# Patient Record
Sex: MALE | Race: WHITE | NOT HISPANIC OR LATINO | Employment: UNEMPLOYED | URBAN - METROPOLITAN AREA
[De-identification: names, ages, dates, MRNs, and addresses within clinical notes are randomized per-mention and may not be internally consistent; named-entity substitution may affect disease eponyms.]

---

## 2017-07-02 ENCOUNTER — HOSPITAL ENCOUNTER (INPATIENT)
Facility: HOSPITAL | Age: 27
LOS: 4 days | Discharge: PSYCHIATRIC OR SUBSTANCE USE FACILITY | DRG: 917 | End: 2017-07-06
Attending: EMERGENCY MEDICINE | Admitting: HOSPITALIST
Payer: COMMERCIAL

## 2017-07-02 DIAGNOSIS — T43.622A: Primary | ICD-10-CM

## 2017-07-02 DIAGNOSIS — T50.901A DRUG OVERDOSE: ICD-10-CM

## 2017-07-02 DIAGNOSIS — F33.2 SEVERE EPISODE OF RECURRENT MAJOR DEPRESSIVE DISORDER, WITHOUT PSYCHOTIC FEATURES: ICD-10-CM

## 2017-07-02 DIAGNOSIS — T43.624A: ICD-10-CM

## 2017-07-02 PROBLEM — T43.621A AMPHETAMINE OVERDOSE: Status: ACTIVE | Noted: 2017-07-02

## 2017-07-02 LAB
ALBUMIN SERPL BCP-MCNC: 4.7 G/DL
ALP SERPL-CCNC: 86 U/L
ALT SERPL W/O P-5'-P-CCNC: 14 U/L
AMPHET+METHAMPHET UR QL: NORMAL
ANION GAP SERPL CALC-SCNC: 17 MMOL/L
APAP SERPL-MCNC: <3 UG/ML
AST SERPL-CCNC: 16 U/L
BARBITURATES UR QL SCN>200 NG/ML: NEGATIVE
BASOPHILS # BLD AUTO: 0.1 K/UL
BASOPHILS NFR BLD: 0.5 %
BENZODIAZ UR QL SCN>200 NG/ML: NORMAL
BILIRUB SERPL-MCNC: 0.5 MG/DL
BILIRUB UR QL STRIP: NEGATIVE
BNP SERPL-MCNC: <10 PG/ML
BUN SERPL-MCNC: 14 MG/DL
BZE UR QL SCN: NEGATIVE
CALCIUM SERPL-MCNC: 10.1 MG/DL
CANNABINOIDS UR QL SCN: NEGATIVE
CHLORIDE SERPL-SCNC: 105 MMOL/L
CK SERPL-CCNC: 145 U/L
CLARITY UR: CLEAR
CO2 SERPL-SCNC: 18 MMOL/L
COLOR UR: YELLOW
CREAT SERPL-MCNC: 1 MG/DL
CREAT UR-MCNC: 328.4 MG/DL
DIFFERENTIAL METHOD: ABNORMAL
EOSINOPHIL # BLD AUTO: 0 K/UL
EOSINOPHIL NFR BLD: 0 %
ERYTHROCYTE [DISTWIDTH] IN BLOOD BY AUTOMATED COUNT: 12.7 %
EST. GFR  (AFRICAN AMERICAN): >60 ML/MIN/1.73 M^2
EST. GFR  (NON AFRICAN AMERICAN): >60 ML/MIN/1.73 M^2
ETHANOL SERPL-MCNC: <10 MG/DL
GLUCOSE SERPL-MCNC: 112 MG/DL
GLUCOSE UR QL STRIP: NEGATIVE
HCT VFR BLD AUTO: 41.9 %
HGB BLD-MCNC: 14.3 G/DL
HGB UR QL STRIP: NEGATIVE
KETONES UR QL STRIP: NEGATIVE
LACTATE SERPL-SCNC: 0.7 MMOL/L
LACTATE SERPL-SCNC: 0.8 MMOL/L
LACTATE SERPL-SCNC: 4.1 MMOL/L
LEUKOCYTE ESTERASE UR QL STRIP: NEGATIVE
LYMPHOCYTES # BLD AUTO: 2.4 K/UL
LYMPHOCYTES NFR BLD: 19.7 %
MCH RBC QN AUTO: 30.8 PG
MCHC RBC AUTO-ENTMCNC: 34 %
MCV RBC AUTO: 91 FL
METHADONE UR QL SCN>300 NG/ML: NEGATIVE
MONOCYTES # BLD AUTO: 1.2 K/UL
MONOCYTES NFR BLD: 10 %
NEUTROPHILS # BLD AUTO: 8.4 K/UL
NEUTROPHILS NFR BLD: 69.8 %
NITRITE UR QL STRIP: NEGATIVE
OPIATES UR QL SCN: NEGATIVE
PCP UR QL SCN>25 NG/ML: NEGATIVE
PH UR STRIP: 6 [PH] (ref 5–8)
PLATELET # BLD AUTO: 313 K/UL
PMV BLD AUTO: 7.7 FL
POTASSIUM SERPL-SCNC: 3.5 MMOL/L
PROT SERPL-MCNC: 8 G/DL
PROT UR QL STRIP: NEGATIVE
RBC # BLD AUTO: 4.63 M/UL
SALICYLATES SERPL-MCNC: <5 MG/DL
SODIUM SERPL-SCNC: 140 MMOL/L
SP GR UR STRIP: 1.02 (ref 1–1.03)
TOXICOLOGY INFORMATION: NORMAL
TROPONIN I SERPL DL<=0.01 NG/ML-MCNC: <0.006 NG/ML
TSH SERPL DL<=0.005 MIU/L-ACNC: 1.4 UIU/ML
URN SPEC COLLECT METH UR: NORMAL
UROBILINOGEN UR STRIP-ACNC: NEGATIVE EU/DL
WBC # BLD AUTO: 12 K/UL

## 2017-07-02 PROCEDURE — 20000000 HC ICU ROOM

## 2017-07-02 PROCEDURE — 96375 TX/PRO/DX INJ NEW DRUG ADDON: CPT

## 2017-07-02 PROCEDURE — 93005 ELECTROCARDIOGRAM TRACING: CPT

## 2017-07-02 PROCEDURE — 25000003 PHARM REV CODE 250: Performed by: NURSE PRACTITIONER

## 2017-07-02 PROCEDURE — 84484 ASSAY OF TROPONIN QUANT: CPT

## 2017-07-02 PROCEDURE — 96360 HYDRATION IV INFUSION INIT: CPT | Mod: XS

## 2017-07-02 PROCEDURE — 80320 DRUG SCREEN QUANTALCOHOLS: CPT

## 2017-07-02 PROCEDURE — 96366 THER/PROPH/DIAG IV INF ADDON: CPT

## 2017-07-02 PROCEDURE — 83605 ASSAY OF LACTIC ACID: CPT

## 2017-07-02 PROCEDURE — 80329 ANALGESICS NON-OPIOID 1 OR 2: CPT

## 2017-07-02 PROCEDURE — 63600175 PHARM REV CODE 636 W HCPCS: Performed by: NURSE PRACTITIONER

## 2017-07-02 PROCEDURE — 81003 URINALYSIS AUTO W/O SCOPE: CPT

## 2017-07-02 PROCEDURE — 83605 ASSAY OF LACTIC ACID: CPT | Mod: 91

## 2017-07-02 PROCEDURE — 82550 ASSAY OF CK (CPK): CPT

## 2017-07-02 PROCEDURE — 80307 DRUG TEST PRSMV CHEM ANLYZR: CPT

## 2017-07-02 PROCEDURE — 85025 COMPLETE CBC W/AUTO DIFF WBC: CPT

## 2017-07-02 PROCEDURE — 80053 COMPREHEN METABOLIC PANEL: CPT

## 2017-07-02 PROCEDURE — 84443 ASSAY THYROID STIM HORMONE: CPT

## 2017-07-02 PROCEDURE — 25000003 PHARM REV CODE 250: Performed by: HOSPITALIST

## 2017-07-02 PROCEDURE — 96372 THER/PROPH/DIAG INJ SC/IM: CPT

## 2017-07-02 PROCEDURE — 83880 ASSAY OF NATRIURETIC PEPTIDE: CPT

## 2017-07-02 PROCEDURE — 96376 TX/PRO/DX INJ SAME DRUG ADON: CPT

## 2017-07-02 PROCEDURE — 63600175 PHARM REV CODE 636 W HCPCS

## 2017-07-02 PROCEDURE — 96365 THER/PROPH/DIAG IV INF INIT: CPT

## 2017-07-02 PROCEDURE — 99285 EMERGENCY DEPT VISIT HI MDM: CPT | Mod: 25

## 2017-07-02 PROCEDURE — 36415 COLL VENOUS BLD VENIPUNCTURE: CPT

## 2017-07-02 RX ORDER — SODIUM,POTASSIUM PHOSPHATES 280-250MG
2 POWDER IN PACKET (EA) ORAL
Status: DISCONTINUED | OUTPATIENT
Start: 2017-07-02 | End: 2017-07-03

## 2017-07-02 RX ORDER — DEXMEDETOMIDINE HYDROCHLORIDE 4 UG/ML
0.2 INJECTION, SOLUTION INTRAVENOUS CONTINUOUS
Status: DISCONTINUED | OUTPATIENT
Start: 2017-07-02 | End: 2017-07-04

## 2017-07-02 RX ORDER — ZIPRASIDONE MESYLATE 20 MG/ML
10 INJECTION, POWDER, LYOPHILIZED, FOR SOLUTION INTRAMUSCULAR EVERY 6 HOURS PRN
Status: DISCONTINUED | OUTPATIENT
Start: 2017-07-02 | End: 2017-07-06 | Stop reason: HOSPADM

## 2017-07-02 RX ORDER — ENOXAPARIN SODIUM 100 MG/ML
40 INJECTION SUBCUTANEOUS EVERY 24 HOURS
Status: DISCONTINUED | OUTPATIENT
Start: 2017-07-03 | End: 2017-07-06 | Stop reason: HOSPADM

## 2017-07-02 RX ORDER — ZIPRASIDONE MESYLATE 20 MG/ML
10 INJECTION, POWDER, LYOPHILIZED, FOR SOLUTION INTRAMUSCULAR EVERY 6 HOURS PRN
Status: DISCONTINUED | OUTPATIENT
Start: 2017-07-02 | End: 2017-07-02

## 2017-07-02 RX ORDER — POTASSIUM CHLORIDE 20 MEQ/15ML
60 SOLUTION ORAL
Status: DISCONTINUED | OUTPATIENT
Start: 2017-07-02 | End: 2017-07-03

## 2017-07-02 RX ORDER — SODIUM CHLORIDE 9 MG/ML
INJECTION, SOLUTION INTRAVENOUS CONTINUOUS
Status: DISCONTINUED | OUTPATIENT
Start: 2017-07-02 | End: 2017-07-03

## 2017-07-02 RX ORDER — ONDANSETRON 2 MG/ML
4 INJECTION INTRAMUSCULAR; INTRAVENOUS
Status: DISCONTINUED | OUTPATIENT
Start: 2017-07-02 | End: 2017-07-02

## 2017-07-02 RX ORDER — ONDANSETRON 2 MG/ML
4 INJECTION INTRAMUSCULAR; INTRAVENOUS EVERY 8 HOURS PRN
Status: DISCONTINUED | OUTPATIENT
Start: 2017-07-02 | End: 2017-07-06 | Stop reason: HOSPADM

## 2017-07-02 RX ORDER — POTASSIUM CHLORIDE 20 MEQ/15ML
40 SOLUTION ORAL
Status: DISCONTINUED | OUTPATIENT
Start: 2017-07-02 | End: 2017-07-03

## 2017-07-02 RX ORDER — ONDANSETRON 2 MG/ML
4 INJECTION INTRAMUSCULAR; INTRAVENOUS
Status: COMPLETED | OUTPATIENT
Start: 2017-07-02 | End: 2017-07-02

## 2017-07-02 RX ORDER — LANOLIN ALCOHOL/MO/W.PET/CERES
800 CREAM (GRAM) TOPICAL
Status: DISCONTINUED | OUTPATIENT
Start: 2017-07-02 | End: 2017-07-03

## 2017-07-02 RX ORDER — HALOPERIDOL 5 MG/ML
5 INJECTION INTRAMUSCULAR EVERY 6 HOURS PRN
Status: DISCONTINUED | OUTPATIENT
Start: 2017-07-02 | End: 2017-07-02

## 2017-07-02 RX ORDER — HALOPERIDOL 5 MG/ML
INJECTION INTRAMUSCULAR
Status: COMPLETED
Start: 2017-07-02 | End: 2017-07-02

## 2017-07-02 RX ORDER — LORAZEPAM 2 MG/ML
4 INJECTION INTRAMUSCULAR
Status: COMPLETED | OUTPATIENT
Start: 2017-07-02 | End: 2017-07-02

## 2017-07-02 RX ORDER — GLUCAGON 1 MG
1 KIT INJECTION
Status: DISCONTINUED | OUTPATIENT
Start: 2017-07-02 | End: 2017-07-03

## 2017-07-02 RX ORDER — PANTOPRAZOLE SODIUM 40 MG/1
40 TABLET, DELAYED RELEASE ORAL DAILY
Status: DISCONTINUED | OUTPATIENT
Start: 2017-07-03 | End: 2017-07-06 | Stop reason: HOSPADM

## 2017-07-02 RX ORDER — IBUPROFEN 200 MG
24 TABLET ORAL
Status: DISCONTINUED | OUTPATIENT
Start: 2017-07-02 | End: 2017-07-03

## 2017-07-02 RX ORDER — HALOPERIDOL 5 MG/ML
5 INJECTION INTRAMUSCULAR EVERY 6 HOURS PRN
Status: DISCONTINUED | OUTPATIENT
Start: 2017-07-02 | End: 2017-07-06 | Stop reason: HOSPADM

## 2017-07-02 RX ORDER — IBUPROFEN 200 MG
16 TABLET ORAL
Status: DISCONTINUED | OUTPATIENT
Start: 2017-07-02 | End: 2017-07-03

## 2017-07-02 RX ORDER — AMOXICILLIN 250 MG
1 CAPSULE ORAL 2 TIMES DAILY
Status: DISCONTINUED | OUTPATIENT
Start: 2017-07-02 | End: 2017-07-06 | Stop reason: HOSPADM

## 2017-07-02 RX ADMIN — LORAZEPAM 4 MG: 2 INJECTION INTRAMUSCULAR; INTRAVENOUS at 12:07

## 2017-07-02 RX ADMIN — DEXMEDETOMIDINE HYDROCHLORIDE 0.2 MCG/KG/HR: 4 INJECTION, SOLUTION INTRAVENOUS at 02:07

## 2017-07-02 RX ADMIN — HALOPERIDOL LACTATE 5 MG: 5 INJECTION, SOLUTION INTRAMUSCULAR at 02:07

## 2017-07-02 RX ADMIN — SODIUM CHLORIDE: 0.9 INJECTION, SOLUTION INTRAVENOUS at 05:07

## 2017-07-02 RX ADMIN — SODIUM CHLORIDE 1000 ML: 0.9 INJECTION, SOLUTION INTRAVENOUS at 12:07

## 2017-07-02 RX ADMIN — ONDANSETRON 4 MG: 2 INJECTION INTRAMUSCULAR; INTRAVENOUS at 12:07

## 2017-07-02 RX ADMIN — LORAZEPAM 4 MG: 2 INJECTION INTRAMUSCULAR; INTRAVENOUS at 11:07

## 2017-07-02 RX ADMIN — DEXMEDETOMIDINE HYDROCHLORIDE 0.7 MCG/KG/HR: 4 INJECTION, SOLUTION INTRAVENOUS at 01:07

## 2017-07-02 RX ADMIN — POISON ADSORBENT 25 G: 50 SUSPENSION ORAL at 11:07

## 2017-07-02 RX ADMIN — HALOPERIDOL 5 MG: 5 INJECTION INTRAMUSCULAR at 02:07

## 2017-07-02 RX ADMIN — POLYETHYLENE GLYCOL-3350 AND ELECTROLYTES WITH FLAVOR PACK 4000 ML: 240; 5.84; 2.98; 6.72; 22.72 POWDER, FOR SOLUTION ORAL at 12:07

## 2017-07-02 RX ADMIN — DEXMEDETOMIDINE HYDROCHLORIDE 0.5 MCG/KG/HR: 4 INJECTION, SOLUTION INTRAVENOUS at 04:07

## 2017-07-02 RX ADMIN — SODIUM CHLORIDE 1000 ML: 0.9 INJECTION, SOLUTION INTRAVENOUS at 11:07

## 2017-07-02 NOTE — ED NOTES
Poison Control called. Recommendation is for benzodiazepine titration to keep agitation and heart rate down.

## 2017-07-02 NOTE — HPI
"The patient is a 26 y.o. male who is presenting per EMS with the acute onset of an amphetamine OD. Per EMS, the pt took x 2 grams of ICE (cystal methamphetamine) approximately 1 hour before arrival to the ED. The pt states that he took "way more than his normal .5 grams" in an attempt to commit suicide but now adamantly denies any desire to die. The pt additionally reports that he has been "out of his antidepressants" and cannot afford the rx. Per EMS the pt is currently living with his father and two children in a hotel.     In ED, patient is extremely agitated, psychotic, and unable to clearly verbalize or comprehend commands. He was heavily sedated, and given activated charcoal to avoid harming himself and was admitted to hospital medicine for management of his overdose. I am unable to do a medical history/ Soc Hx and Fam Hx d/t patient confusion and agitation.  "

## 2017-07-02 NOTE — ASSESSMENT & PLAN NOTE
Severe, intentional per original note. Likely toxidrome will be resolved in 12-24 hours. Continue aggressive management of agitation with precedex gtt, IM haldol and Geodon, as well as monitoring of mental status and Vital signs. Patient failed benzo management. Patient currently PEC'd. Psych consulted.

## 2017-07-02 NOTE — SUBJECTIVE & OBJECTIVE
Past Medical History:   Diagnosis Date    Substance abuse withdrawal with complication        History reviewed. No pertinent surgical history.    Review of patient's allergies indicates:  No Known Allergies    No current facility-administered medications on file prior to encounter.      No current outpatient prescriptions on file prior to encounter.     Family History     Problem Relation (Age of Onset)    Drug abuse Father        Social History Main Topics    Smoking status: Current Every Day Smoker     Packs/day: 0.50     Types: Cigarettes    Smokeless tobacco: Not on file    Alcohol use Yes    Drug use:      Types: Methamphetamines    Sexual activity: Not on file     Review of Systems   Unable to perform ROS: Psychiatric disorder     Objective:     Vital Signs (Most Recent):  Temp: 97.2 °F (36.2 °C) (07/02/17 1119)  Pulse: 109 (07/02/17 1500)  Resp: (!) 28 (07/02/17 1119)  BP: 132/63 (07/02/17 1500)  SpO2: 100 % (07/02/17 1451) Vital Signs (24h Range):  Temp:  [97.2 °F (36.2 °C)] 97.2 °F (36.2 °C)  Pulse:  [109-151] 109  Resp:  [28] 28  SpO2:  [86 %-100 %] 100 %  BP: (114-168)/(61-79) 132/63     Weight: 83.9 kg (185 lb)  Body mass index is 26.54 kg/m².    Physical Exam   Constitutional: No distress.   Thin, emaciated   HENT:   Head: Normocephalic.   Mouth/Throat: Oropharynx is clear and moist.   Eyes: EOM are normal. Pupils are equal, round, and reactive to light. No scleral icterus.   Cardiovascular: Normal heart sounds and intact distal pulses.  Exam reveals no friction rub.    No murmur heard.  extremely tachycardic   Pulmonary/Chest: Effort normal and breath sounds normal. No respiratory distress. He has no wheezes.   Abdominal: Soft. Bowel sounds are normal. He exhibits no distension. There is no tenderness.   Musculoskeletal: Normal range of motion. He exhibits no edema.   Neurological: He is alert. He has normal reflexes.   Patient acutely confused, akasthetic   Skin: No rash noted. He is not  diaphoretic. No erythema.   Multiple skin lesions   Psychiatric:   Severe psychosis, akasthesia, confusion.   Nursing note and vitals reviewed.       Significant Labs: All pertinent labs within the past 24 hours have been reviewed.    Significant Imaging: I have reviewed all pertinent imaging results/findings within the past 24 hours.

## 2017-07-02 NOTE — ED PROVIDER NOTES
"Encounter Date: 7/2/2017    SCRIBE #1 NOTE: I, Berna Westfall , am scribing for, and in the presence of, Dr. Pereyra .       History     Chief Complaint   Patient presents with    Drug Overdose     " ate 2 grams of ICE "     07/02/2017  11:26 AM     Chief Complaint: Drug OD      The patient is a 26 y.o. male who is presenting per EMS with the acute onset of an amphetamine OD. Per EMS, the pt took x 2 grams of ICE (cystal methamphetamine) approximately 1 hour before arrival to the ED. Per EMS, the pt stated that he took more than his normal 0.5 grams in an attempt to commit suicide but now denies any desire to die. The pt additionally reports that he has been out of his antidepressants and cannot afford the rx. Per EMS the pt is currently living with his father and two children in a hotel.           The history is provided by the patient and the EMS personnel.     Review of patient's allergies indicates:  Allergies not on file  Past Medical History:   Diagnosis Date    Substance abuse withdrawal with complication      History reviewed. No pertinent surgical history.  Family History   Problem Relation Age of Onset    Drug abuse Father      Social History   Substance Use Topics    Smoking status: Current Every Day Smoker     Packs/day: 0.50     Types: Cigarettes    Smokeless tobacco: Not on file    Alcohol use Yes     Review of Systems   Unable to perform ROS: Mental status change (drug OD)       Physical Exam     Initial Vitals [07/02/17 1119]   BP Pulse Resp Temp SpO2   114/65 -- -- -- --      MAP       81.33         Physical Exam    Nursing note and vitals reviewed.  Constitutional: He appears toxic. He appears distressed.   Disheveled    HENT:   Head: Normocephalic and atraumatic.   Eyes: EOM are normal.   Pupils are dilated bilaterally    Neck: Normal range of motion. Neck supple.   Cardiovascular: Regular rhythm, normal heart sounds and intact distal pulses. Exam reveals no gallop and no friction rub.    No " murmur heard.  Tachycardic    Pulmonary/Chest: Breath sounds normal. He has no wheezes. He has no rhonchi. He has no rales.   Abdominal: Soft. He exhibits no distension. There is no tenderness.   Musculoskeletal: Normal range of motion.   Neurological: He is alert.   Hypertonia noted to RUE.    Skin: Skin is warm and dry. Capillary refill takes less than 2 seconds.   Psychiatric: His mood appears anxious. His speech is rapid and/or pressured. He is agitated, hyperactive and actively hallucinating. Thought content is delusional. Cognition and memory are impaired. He expresses inappropriate judgment. He does not express impulsivity. He exhibits a depressed mood. He expresses suicidal ideation. He expresses suicidal plans. He is noncommunicative.         ED Course   Procedures  Labs Reviewed   CBC W/ AUTO DIFFERENTIAL - Abnormal; Notable for the following:        Result Value    MPV 7.7 (*)     Gran # 8.4 (*)     Mono # 1.2 (*)     All other components within normal limits   COMPREHENSIVE METABOLIC PANEL - Abnormal; Notable for the following:     CO2 18 (*)     Glucose 112 (*)     Anion Gap 17 (*)     All other components within normal limits   ACETAMINOPHEN LEVEL - Abnormal; Notable for the following:     Acetaminophen (Tylenol), Serum <3.0 (*)     All other components within normal limits   SALICYLATE LEVEL - Abnormal; Notable for the following:     Salicylate Lvl <5.0 (*)     All other components within normal limits   LACTIC ACID, PLASMA - Abnormal; Notable for the following:     Lactate (Lactic Acid) 4.1 (*)     All other components within normal limits    Narrative:        critical LA result(s) called and verbal readback obtained from   LUAN Graham @8948, 07/02/2017 12:51   TSH   DRUG SCREEN PANEL, URINE EMERGENCY   ALCOHOL,MEDICAL (ETHANOL)   TROPONIN I   B-TYPE NATRIURETIC PEPTIDE   URINALYSIS   CK   LACTIC ACID, PLASMA                   APC / Resident Notes:   Elbert Multani is a 26 year old male presenting to the  ED after suicide attempt by ingestion of crystal meth. The patient was tachycardic and hypertensive initially. Ingestion occurred approximately one hour PTA so patient was given activated charcoal and go-lytely. He was also aggressively treated with IV ativan but eventually required a precedex drip for symptom control. He was admitted to ICU to Dr. Griffiths for continued management. Patient was placed under a PEC due to suicidal ideation with attempt.        Scribe Attestation:   Scribe #1: I performed the above scribed service and the documentation accurately describes the services I performed. I attest to the accuracy of the note.    Attending Attestation:           Physician Attestation for Scribe:  Physician Attestation Statement for Scribe #1: I, Dr. Pereyra , reviewed documentation, as scribed by Berna Westfall in my presence, and it is both accurate and complete.                 ED Course     Clinical Impression:   The primary encounter diagnosis was Amphetamine overdose, intentional self-harm, initial encounter. A diagnosis of Drug overdose was also pertinent to this visit.    Disposition:   Disposition: Admitted  Condition: Serious                        April Zaragoza NP  07/02/17 4063

## 2017-07-02 NOTE — H&P
"[facility] Hospital Medicine  History & Physical    Patient Name: Elbert Multani  MRN: [id number]  Admission Date: 7/2/2017  Attending Physician: Buddy Griffiths MD  Primary Care Provider: Primary Doctor No         Patient information was obtained from EMS personnel and ER records.     Subjective:     Principal Problem:Amphetamine overdose    Chief Complaint:   Chief Complaint   Patient presents with    Drug Overdose     " ate 2 grams of ICE "        HPI: The patient is a 26 y.o. male who is presenting per EMS with the acute onset of an amphetamine OD. Per EMS, the pt took x 2 grams of ICE (cystal methamphetamine) approximately 1 hour before arrival to the ED. The pt states that he took "way more than his normal .5 grams" in an attempt to commit suicide but now adamantly denies any desire to die. The pt additionally reports that he has been "out of his antidepressants" and cannot afford the rx. Per EMS the pt is currently living with his father and two children in a hotel.     In ED, patient is extremely agitated, psychotic, and unable to clearly verbalize or comprehend commands. He was heavily sedated, and given activated charcoal to avoid harming himself and was admitted to hospital medicine for management of his overdose. I am unable to do a medical history/ Soc Hx and Fam Hx d/t patient confusion and agitation.    Past Medical History:   Diagnosis Date    Substance abuse withdrawal with complication        History reviewed. No pertinent surgical history.    Review of patient's allergies indicates:  No Known Allergies    No current facility-administered medications on file prior to encounter.      No current outpatient prescriptions on file prior to encounter.     Family History     Problem Relation (Age of Onset)    Drug abuse Father        Social History Main Topics    Smoking status: Current Every Day Smoker     Packs/day: 0.50     Types: Cigarettes    Smokeless tobacco: Not on file    " Alcohol use Yes    Drug use:      Types: Methamphetamines    Sexual activity: Not on file     Review of Systems   Unable to perform ROS: Psychiatric disorder     Objective:     Vital Signs (Most Recent):  Temp: 97.2 °F (36.2 °C) (07/02/17 1119)  Pulse: 109 (07/02/17 1500)  Resp: (!) 28 (07/02/17 1119)  BP: 132/63 (07/02/17 1500)  SpO2: 100 % (07/02/17 1451) Vital Signs (24h Range):  Temp:  [97.2 °F (36.2 °C)] 97.2 °F (36.2 °C)  Pulse:  [109-151] 109  Resp:  [28] 28  SpO2:  [86 %-100 %] 100 %  BP: (114-168)/(61-79) 132/63     Weight: 83.9 kg (185 lb)  Body mass index is 26.54 kg/m².    Physical Exam   Constitutional: No distress.   Thin, emaciated   HENT:   Head: Normocephalic.   Mouth/Throat: Oropharynx is clear and moist.   Eyes: EOM are normal. Pupils are equal, round, and reactive to light. No scleral icterus.   Cardiovascular: Normal heart sounds and intact distal pulses.  Exam reveals no friction rub.    No murmur heard.  extremely tachycardic   Pulmonary/Chest: Effort normal and breath sounds normal. No respiratory distress. He has no wheezes.   Abdominal: Soft. Bowel sounds are normal. He exhibits no distension. There is no tenderness.   Musculoskeletal: Normal range of motion. He exhibits no edema.   Neurological: He is alert. He has normal reflexes.   Patient acutely confused, akasthetic   Skin: No rash noted. He is not diaphoretic. No erythema.   Multiple skin lesions   Psychiatric:   Severe psychosis, akasthesia, confusion.   Nursing note and vitals reviewed.       Significant Labs: All pertinent labs within the past 24 hours have been reviewed.    Significant Imaging: I have reviewed all pertinent imaging results/findings within the past 24 hours.    Assessment/Plan:     Amphetamine overdose    Severe, intentional per original note. Likely toxidrome will be resolved in 12-24 hours. Continue aggressive management of agitation with precedex gtt, IM haldol and Geodon, as well as monitoring of mental  status and Vital signs. Patient failed benzo management. Patient currently PEC'd. Psych consulted.            VTE Risk Mitigation     None        Buddy Griffiths MD  Department of Hospital Medicine   [facility]

## 2017-07-03 ENCOUNTER — SURGERY (OUTPATIENT)
Age: 27
End: 2017-07-03

## 2017-07-03 ENCOUNTER — ANESTHESIA (OUTPATIENT)
Dept: SURGERY | Facility: HOSPITAL | Age: 27
DRG: 917 | End: 2017-07-03
Payer: COMMERCIAL

## 2017-07-03 ENCOUNTER — ANESTHESIA EVENT (OUTPATIENT)
Dept: SURGERY | Facility: HOSPITAL | Age: 27
DRG: 917 | End: 2017-07-03
Payer: COMMERCIAL

## 2017-07-03 PROBLEM — T50.902A SUICIDE ATTEMPT BY DRUG INGESTION: Status: ACTIVE | Noted: 2017-07-03

## 2017-07-03 PROBLEM — F15.20 METHAMPHETAMINE DEPENDENCE, CONTINUOUS: Status: ACTIVE | Noted: 2017-07-03

## 2017-07-03 PROBLEM — S43.004A DISLOCATION OF RIGHT SHOULDER JOINT: Status: ACTIVE | Noted: 2017-07-03

## 2017-07-03 PROBLEM — F33.2 SEVERE EPISODE OF RECURRENT MAJOR DEPRESSIVE DISORDER, WITHOUT PSYCHOTIC FEATURES: Status: ACTIVE | Noted: 2017-07-03

## 2017-07-03 PROBLEM — Z72.0 TOBACCO ABUSE DISORDER: Status: ACTIVE | Noted: 2017-07-03

## 2017-07-03 LAB
ANION GAP SERPL CALC-SCNC: 8 MMOL/L
ANION GAP SERPL CALC-SCNC: 8 MMOL/L
BASOPHILS # BLD AUTO: 0 K/UL
BASOPHILS NFR BLD: 0.3 %
BUN SERPL-MCNC: 9 MG/DL
BUN SERPL-MCNC: 9 MG/DL
CALCIUM SERPL-MCNC: 8.6 MG/DL
CALCIUM SERPL-MCNC: 8.6 MG/DL
CHLORIDE SERPL-SCNC: 111 MMOL/L
CHLORIDE SERPL-SCNC: 111 MMOL/L
CO2 SERPL-SCNC: 22 MMOL/L
CO2 SERPL-SCNC: 22 MMOL/L
CREAT SERPL-MCNC: 0.7 MG/DL
CREAT SERPL-MCNC: 0.7 MG/DL
DIFFERENTIAL METHOD: ABNORMAL
EOSINOPHIL # BLD AUTO: 0 K/UL
EOSINOPHIL NFR BLD: 0 %
ERYTHROCYTE [DISTWIDTH] IN BLOOD BY AUTOMATED COUNT: 12.9 %
EST. GFR  (AFRICAN AMERICAN): >60 ML/MIN/1.73 M^2
EST. GFR  (AFRICAN AMERICAN): >60 ML/MIN/1.73 M^2
EST. GFR  (NON AFRICAN AMERICAN): >60 ML/MIN/1.73 M^2
EST. GFR  (NON AFRICAN AMERICAN): >60 ML/MIN/1.73 M^2
GLUCOSE SERPL-MCNC: 94 MG/DL
GLUCOSE SERPL-MCNC: 94 MG/DL
HCT VFR BLD AUTO: 35 %
HGB BLD-MCNC: 11.7 G/DL
LYMPHOCYTES # BLD AUTO: 1.4 K/UL
LYMPHOCYTES NFR BLD: 22 %
MCH RBC QN AUTO: 30.5 PG
MCHC RBC AUTO-ENTMCNC: 33.4 %
MCV RBC AUTO: 91 FL
MONOCYTES # BLD AUTO: 0.5 K/UL
MONOCYTES NFR BLD: 8.6 %
NEUTROPHILS # BLD AUTO: 4.4 K/UL
NEUTROPHILS NFR BLD: 69.1 %
PLATELET # BLD AUTO: 197 K/UL
PLATELET BLD QL SMEAR: ABNORMAL
PMV BLD AUTO: 7.2 FL
POTASSIUM SERPL-SCNC: 3.7 MMOL/L
POTASSIUM SERPL-SCNC: 3.7 MMOL/L
RBC # BLD AUTO: 3.84 M/UL
SODIUM SERPL-SCNC: 141 MMOL/L
SODIUM SERPL-SCNC: 141 MMOL/L
WBC # BLD AUTO: 6.4 K/UL

## 2017-07-03 PROCEDURE — D9220A PRA ANESTHESIA: Mod: CRNA,,, | Performed by: NURSE ANESTHETIST, CERTIFIED REGISTERED

## 2017-07-03 PROCEDURE — 63600175 PHARM REV CODE 636 W HCPCS: Performed by: HOSPITALIST

## 2017-07-03 PROCEDURE — 0RSJXZZ REPOSITION RIGHT SHOULDER JOINT, EXTERNAL APPROACH: ICD-10-PCS | Performed by: ORTHOPAEDIC SURGERY

## 2017-07-03 PROCEDURE — 71000033 HC RECOVERY, INTIAL HOUR: Performed by: ORTHOPAEDIC SURGERY

## 2017-07-03 PROCEDURE — 25000003 PHARM REV CODE 250: Performed by: NURSE ANESTHETIST, CERTIFIED REGISTERED

## 2017-07-03 PROCEDURE — 25000003 PHARM REV CODE 250: Performed by: HOSPITALIST

## 2017-07-03 PROCEDURE — 20000000 HC ICU ROOM

## 2017-07-03 PROCEDURE — 36000704 HC OR TIME LEV I 1ST 15 MIN: Performed by: ORTHOPAEDIC SURGERY

## 2017-07-03 PROCEDURE — 23650 CLTX SHO DSLC W/MNPJ WO ANES: CPT | Mod: RT,,, | Performed by: ORTHOPAEDIC SURGERY

## 2017-07-03 PROCEDURE — 36000705 HC OR TIME LEV I EA ADD 15 MIN: Performed by: ORTHOPAEDIC SURGERY

## 2017-07-03 PROCEDURE — D9220A PRA ANESTHESIA: Mod: ANES,,, | Performed by: ANESTHESIOLOGY

## 2017-07-03 PROCEDURE — 85025 COMPLETE CBC W/AUTO DIFF WBC: CPT

## 2017-07-03 PROCEDURE — 25000003 PHARM REV CODE 250: Performed by: NURSE PRACTITIONER

## 2017-07-03 PROCEDURE — 80048 BASIC METABOLIC PNL TOTAL CA: CPT

## 2017-07-03 PROCEDURE — 63600175 PHARM REV CODE 636 W HCPCS: Performed by: NURSE ANESTHETIST, CERTIFIED REGISTERED

## 2017-07-03 PROCEDURE — 37000009 HC ANESTHESIA EA ADD 15 MINS: Performed by: ORTHOPAEDIC SURGERY

## 2017-07-03 PROCEDURE — 71000039 HC RECOVERY, EACH ADD'L HOUR: Performed by: ORTHOPAEDIC SURGERY

## 2017-07-03 PROCEDURE — 37000008 HC ANESTHESIA 1ST 15 MINUTES: Performed by: ORTHOPAEDIC SURGERY

## 2017-07-03 PROCEDURE — 25000003 PHARM REV CODE 250: Performed by: PSYCHIATRY & NEUROLOGY

## 2017-07-03 PROCEDURE — 36415 COLL VENOUS BLD VENIPUNCTURE: CPT

## 2017-07-03 PROCEDURE — 63600175 PHARM REV CODE 636 W HCPCS: Performed by: ANESTHESIOLOGY

## 2017-07-03 RX ORDER — HYDROMORPHONE HYDROCHLORIDE 2 MG/ML
0.2 INJECTION, SOLUTION INTRAMUSCULAR; INTRAVENOUS; SUBCUTANEOUS EVERY 5 MIN PRN
Status: DISCONTINUED | OUTPATIENT
Start: 2017-07-03 | End: 2017-07-03

## 2017-07-03 RX ORDER — MORPHINE SULFATE 10 MG/ML
2 INJECTION INTRAMUSCULAR; INTRAVENOUS; SUBCUTANEOUS EVERY 5 MIN PRN
Status: DISCONTINUED | OUTPATIENT
Start: 2017-07-03 | End: 2017-07-03

## 2017-07-03 RX ORDER — ONDANSETRON 2 MG/ML
4 INJECTION INTRAMUSCULAR; INTRAVENOUS DAILY PRN
Status: DISCONTINUED | OUTPATIENT
Start: 2017-07-03 | End: 2017-07-03

## 2017-07-03 RX ORDER — QUETIAPINE FUMARATE 25 MG/1
25 TABLET, FILM COATED ORAL NIGHTLY
Status: DISCONTINUED | OUTPATIENT
Start: 2017-07-03 | End: 2017-07-03

## 2017-07-03 RX ORDER — LORAZEPAM 2 MG/ML
0.25 INJECTION INTRAMUSCULAR
Status: DISCONTINUED | OUTPATIENT
Start: 2017-07-03 | End: 2017-07-03

## 2017-07-03 RX ORDER — ZOLPIDEM TARTRATE 5 MG/1
5 TABLET ORAL NIGHTLY PRN
Status: DISCONTINUED | OUTPATIENT
Start: 2017-07-03 | End: 2017-07-06 | Stop reason: HOSPADM

## 2017-07-03 RX ORDER — MIDAZOLAM HYDROCHLORIDE 1 MG/ML
INJECTION, SOLUTION INTRAMUSCULAR; INTRAVENOUS
Status: DISCONTINUED | OUTPATIENT
Start: 2017-07-03 | End: 2017-07-03

## 2017-07-03 RX ORDER — ACETAMINOPHEN 500 MG
1000 TABLET ORAL EVERY 6 HOURS PRN
Status: DISCONTINUED | OUTPATIENT
Start: 2017-07-03 | End: 2017-07-06 | Stop reason: HOSPADM

## 2017-07-03 RX ORDER — QUETIAPINE FUMARATE 25 MG/1
50 TABLET, FILM COATED ORAL NIGHTLY
Status: DISCONTINUED | OUTPATIENT
Start: 2017-07-03 | End: 2017-07-03

## 2017-07-03 RX ORDER — MEPERIDINE HYDROCHLORIDE 25 MG/ML
12.5 INJECTION INTRAMUSCULAR; INTRAVENOUS; SUBCUTANEOUS ONCE AS NEEDED
Status: DISCONTINUED | OUTPATIENT
Start: 2017-07-03 | End: 2017-07-03

## 2017-07-03 RX ORDER — KETAMINE HYDROCHLORIDE 100 MG/ML
INJECTION, SOLUTION INTRAMUSCULAR; INTRAVENOUS
Status: DISCONTINUED | OUTPATIENT
Start: 2017-07-03 | End: 2017-07-03

## 2017-07-03 RX ORDER — SERTRALINE HYDROCHLORIDE 50 MG/1
50 TABLET, FILM COATED ORAL DAILY
Status: DISCONTINUED | OUTPATIENT
Start: 2017-07-03 | End: 2017-07-06 | Stop reason: HOSPADM

## 2017-07-03 RX ORDER — SODIUM CHLORIDE, SODIUM LACTATE, POTASSIUM CHLORIDE, CALCIUM CHLORIDE 600; 310; 30; 20 MG/100ML; MG/100ML; MG/100ML; MG/100ML
INJECTION, SOLUTION INTRAVENOUS CONTINUOUS PRN
Status: DISCONTINUED | OUTPATIENT
Start: 2017-07-03 | End: 2017-07-03

## 2017-07-03 RX ORDER — FENTANYL CITRATE 50 UG/ML
INJECTION, SOLUTION INTRAMUSCULAR; INTRAVENOUS
Status: DISCONTINUED | OUTPATIENT
Start: 2017-07-03 | End: 2017-07-03

## 2017-07-03 RX ORDER — HYDROCODONE BITARTRATE AND ACETAMINOPHEN 5; 325 MG/1; MG/1
1 TABLET ORAL EVERY 4 HOURS PRN
Status: DISCONTINUED | OUTPATIENT
Start: 2017-07-03 | End: 2017-07-04

## 2017-07-03 RX ORDER — SODIUM CHLORIDE 0.9 % (FLUSH) 0.9 %
3 SYRINGE (ML) INJECTION
Status: DISCONTINUED | OUTPATIENT
Start: 2017-07-03 | End: 2017-07-06 | Stop reason: HOSPADM

## 2017-07-03 RX ORDER — KETOROLAC TROMETHAMINE 30 MG/ML
30 INJECTION, SOLUTION INTRAMUSCULAR; INTRAVENOUS EVERY 6 HOURS PRN
Status: DISPENSED | OUTPATIENT
Start: 2017-07-03 | End: 2017-07-06

## 2017-07-03 RX ORDER — CLONIDINE HYDROCHLORIDE 0.1 MG/1
0.1 TABLET ORAL EVERY 4 HOURS
Status: DISCONTINUED | OUTPATIENT
Start: 2017-07-03 | End: 2017-07-06 | Stop reason: HOSPADM

## 2017-07-03 RX ORDER — ASCORBIC ACID 500 MG
500 TABLET ORAL 2 TIMES DAILY
Status: DISCONTINUED | OUTPATIENT
Start: 2017-07-03 | End: 2017-07-06 | Stop reason: HOSPADM

## 2017-07-03 RX ORDER — QUETIAPINE FUMARATE 100 MG/1
100 TABLET, FILM COATED ORAL NIGHTLY
Status: DISCONTINUED | OUTPATIENT
Start: 2017-07-03 | End: 2017-07-06 | Stop reason: HOSPADM

## 2017-07-03 RX ORDER — SODIUM CHLORIDE 9 MG/ML
INJECTION, SOLUTION INTRAVENOUS CONTINUOUS PRN
Status: DISCONTINUED | OUTPATIENT
Start: 2017-07-03 | End: 2017-07-03

## 2017-07-03 RX ORDER — PROPOFOL 10 MG/ML
VIAL (ML) INTRAVENOUS
Status: DISCONTINUED | OUTPATIENT
Start: 2017-07-03 | End: 2017-07-03

## 2017-07-03 RX ORDER — ONDANSETRON 2 MG/ML
INJECTION INTRAMUSCULAR; INTRAVENOUS
Status: DISCONTINUED | OUTPATIENT
Start: 2017-07-03 | End: 2017-07-03

## 2017-07-03 RX ORDER — MORPHINE SULFATE 2 MG/ML
2 INJECTION, SOLUTION INTRAMUSCULAR; INTRAVENOUS ONCE AS NEEDED
Status: COMPLETED | OUTPATIENT
Start: 2017-07-03 | End: 2017-07-03

## 2017-07-03 RX ADMIN — ONDANSETRON 4 MG: 2 INJECTION, SOLUTION INTRAMUSCULAR; INTRAVENOUS at 06:07

## 2017-07-03 RX ADMIN — SERTRALINE HYDROCHLORIDE 50 MG: 50 TABLET ORAL at 10:07

## 2017-07-03 RX ADMIN — SODIUM CHLORIDE: 0.9 INJECTION, SOLUTION INTRAVENOUS at 05:07

## 2017-07-03 RX ADMIN — HYDROCODONE BITARTATE AND ACETAMINOPHEN 1 TABLET: 5; 325 TABLET ORAL at 08:07

## 2017-07-03 RX ADMIN — KETAMINE HYDROCHLORIDE 30 MG: 100 INJECTION, SOLUTION, CONCENTRATE INTRAMUSCULAR; INTRAVENOUS at 05:07

## 2017-07-03 RX ADMIN — ENOXAPARIN SODIUM 40 MG: 100 INJECTION SUBCUTANEOUS at 09:07

## 2017-07-03 RX ADMIN — HYDROCODONE BITARTATE AND ACETAMINOPHEN 1 TABLET: 5; 325 TABLET ORAL at 02:07

## 2017-07-03 RX ADMIN — KETAMINE HYDROCHLORIDE 10 MG: 100 INJECTION, SOLUTION, CONCENTRATE INTRAMUSCULAR; INTRAVENOUS at 06:07

## 2017-07-03 RX ADMIN — POTASSIUM CHLORIDE 40 MEQ: 20 SOLUTION ORAL at 09:07

## 2017-07-03 RX ADMIN — KETOROLAC TROMETHAMINE 30 MG: 30 INJECTION, SOLUTION INTRAMUSCULAR at 01:07

## 2017-07-03 RX ADMIN — FENTANYL CITRATE 50 MCG: 50 INJECTION INTRAMUSCULAR; INTRAVENOUS at 05:07

## 2017-07-03 RX ADMIN — MIDAZOLAM 2 MG: 1 INJECTION INTRAMUSCULAR; INTRAVENOUS at 05:07

## 2017-07-03 RX ADMIN — ACETAMINOPHEN 1000 MG: 500 TABLET, FILM COATED ORAL at 10:07

## 2017-07-03 RX ADMIN — STANDARDIZED SENNA CONCENTRATE AND DOCUSATE SODIUM 1 TABLET: 8.6; 5 TABLET, FILM COATED ORAL at 08:07

## 2017-07-03 RX ADMIN — QUETIAPINE FUMARATE 100 MG: 100 TABLET, FILM COATED ORAL at 08:07

## 2017-07-03 RX ADMIN — STANDARDIZED SENNA CONCENTRATE AND DOCUSATE SODIUM 1 TABLET: 8.6; 5 TABLET, FILM COATED ORAL at 09:07

## 2017-07-03 RX ADMIN — SODIUM CHLORIDE, SODIUM LACTATE, POTASSIUM CHLORIDE, AND CALCIUM CHLORIDE: .6; .31; .03; .02 INJECTION, SOLUTION INTRAVENOUS at 06:07

## 2017-07-03 RX ADMIN — MORPHINE SULFATE 2 MG: 2 INJECTION, SOLUTION INTRAMUSCULAR; INTRAVENOUS at 06:07

## 2017-07-03 RX ADMIN — KETOROLAC TROMETHAMINE 30 MG: 30 INJECTION, SOLUTION INTRAMUSCULAR at 10:07

## 2017-07-03 RX ADMIN — PROPOFOL 50 MG: 10 INJECTION, EMULSION INTRAVENOUS at 06:07

## 2017-07-03 RX ADMIN — PANTOPRAZOLE SODIUM 40 MG: 40 TABLET, DELAYED RELEASE ORAL at 09:07

## 2017-07-03 RX ADMIN — PROPOFOL 100 MG: 10 INJECTION, EMULSION INTRAVENOUS at 05:07

## 2017-07-03 RX ADMIN — OXYCODONE HYDROCHLORIDE AND ACETAMINOPHEN 500 MG: 500 TABLET ORAL at 08:07

## 2017-07-03 RX ADMIN — FENTANYL CITRATE 50 MCG: 50 INJECTION INTRAMUSCULAR; INTRAVENOUS at 06:07

## 2017-07-03 RX ADMIN — CLONIDINE HYDROCHLORIDE 0.1 MG: 0.1 TABLET ORAL at 08:07

## 2017-07-03 NOTE — ANESTHESIA POSTPROCEDURE EVALUATION
"Anesthesia Post Evaluation    Patient: Elbert Carbajal    Procedure(s) Performed: Procedure(s) (LRB):  REDUCTION-CLOSED-SHOULDER (Right)    Final Anesthesia Type: general  Patient location during evaluation: PACU  Patient participation: Yes- Able to Participate  Level of consciousness: awake and alert  Post-procedure vital signs: reviewed and stable  Pain management: adequate  Airway patency: patent  PONV status at discharge: No PONV  Anesthetic complications: no      Cardiovascular status: blood pressure returned to baseline and hemodynamically stable  Respiratory status: unassisted  Hydration status: euvolemic  Follow-up not needed.        Visit Vitals  /71   Pulse 96   Temp 37 °C (98.6 °F) (Oral)   Resp 20   Ht 5' 10" (1.778 m)   Wt 70.4 kg (155 lb 3.3 oz)   SpO2 100%   BMI 22.27 kg/m²       Pain/Nickolas Score: Pain Assessment Performed: Yes (7/3/2017  6:30 PM)  Presence of Pain: complains of pain/discomfort (7/3/2017  6:30 PM)  Pain Rating Prior to Med Admin: 7 (7/3/2017  6:50 PM)  Pain Rating Post Med Admin: 7 (7/3/2017  3:31 PM)      "

## 2017-07-03 NOTE — TRANSFER OF CARE
"Anesthesia Transfer of Care Note    Patient: Elbert Carbajal    Procedure(s) Performed: Procedure(s) (LRB):  REDUCTION-CLOSED-SHOULDER (Right)    Patient location: PACU    Anesthesia Type: MAC    Transport from OR: Transported from OR on 2-3 L/min O2 by NC with adequate spontaneous ventilation    Post pain: adequate analgesia    Post assessment: no apparent anesthetic complications    Post vital signs: stable    Level of consciousness: awake    Nausea/Vomiting: no nausea/vomiting    Complications: none    Transfer of care protocol was followed      Last vitals:   Visit Vitals  /71   Pulse 96   Temp 37 °C (98.6 °F) (Oral)   Resp 20   Ht 5' 10" (1.778 m)   Wt 70.4 kg (155 lb 3.3 oz)   SpO2 100%   BMI 22.27 kg/m²     "

## 2017-07-03 NOTE — PLAN OF CARE
Problem: Patient Care Overview  Goal: Plan of Care Review  Outcome: Ongoing (interventions implemented as appropriate)  Pt weaned off of precedex drip, has remained calm throughout shift. Pt R arm dislocated today while moving around, states it happens to him frequently; ortho consulted, plan to reduce this evening. Pt has been CEC'd today, will work on placement after pt has been medically cleared. He's remained free from falls/injuries this shift. Risk sitter at BS.

## 2017-07-03 NOTE — PROGRESS NOTES
Pt's grandparents at  requesting update. I informed them that he had been CEC'd and what that meant. I explained to them that CEC hold last for a total of 15 days, from original PEC. I then informed them he would be transferred to an in pt facility once he was medically cleared. They began getting upset saying he couldn't stay here b/c he's on parole and not suppose to leave state of [place]. I informed them that it was too late for that and what was done was done, he's now been involuntarily admitted and that the CEC would have to  or he would have to go to in pt facility and be discharged from there. He voiced concern about going to group home and I informed him nothing can be done about that but if he wished to call his  and inform him of what's going on that I would let him use my phone. He stated he would just let his  find out when he finds out.    Aristeo Caban RN

## 2017-07-03 NOTE — PROGRESS NOTES
Pt c/o R shoulder pain. Pt stated that it feels like his shoulder came out of place again. He said that this has happened several times in past and he had surgery on it 2 months ago to attempt to fix this issue. Dr. Griffiths called and notified of this, orders for x-ray series placed.     Aristeo Caban RN

## 2017-07-03 NOTE — ANESTHESIA PREPROCEDURE EVALUATION
07/03/2017  Elbert Carbajal is a 26 y.o., male.    Anesthesia Evaluation    I have reviewed the Patient Summary Reports.    I have reviewed the Nursing Notes.      Review of Systems  Anesthesia Hx:  No problems with previous Anesthesia    Hepatic/GI:   Liver Disease, Hepatitis, C    Psych:   Recent amphetamine OD         Physical Exam  General:  Well nourished                 Anesthesia Plan  Type of Anesthesia, risks & benefits discussed:  Anesthesia Type:  general  Patient's Preference:   Intra-op Monitoring Plan:   Intra-op Monitoring Plan Comments:   Post Op Pain Control Plan:   Post Op Pain Control Plan Comments:   Induction:   IV  Beta Blocker:  Patient is not currently on a Beta-Blocker (No further documentation required).       Informed Consent: Patient understands risks and agrees with Anesthesia plan.  Questions answered. Anesthesia consent signed with patient.  ASA Score: 2     Day of Surgery Review of History & Physical:    H&P update referred to the surgeon.         Ready For Surgery From Anesthesia Perspective.

## 2017-07-03 NOTE — PLAN OF CARE
Problem: Patient Care Overview  Goal: Plan of Care Review  Outcome: Ongoing (interventions implemented as appropriate)  OD/PEC patient on precedex@0.2 and weaning. No aggitation or DTs this shift. Patient expresses logical thought and remorse, and does not endorse suicidal ideations. Patient was informed of present condition and advised to rest. Patient stated understanding. AAOx4, purposeful motor response with no demonstrated need for restraints at this time. Tolerating ice chips without aspiration noted. May consider NGT removal today, as patient remains off sedation with no incident.    All safety measures in place at this time. Patient resting quietly. Will continue to monitor.

## 2017-07-03 NOTE — PLAN OF CARE
"Met with pt to complete his assessment.  Pt stated that he was discharged from [facility] [place], [place] on 6-30-17 to his grandmother's home.  Pt stated that his father lives on the same property as his grandmother.  Pt is scheduled to start [facility]pt [facility] the [facility] on Monday under Dr. Garcia's care.  Pt denied getting the prescriptions filled when he discharged from the [facility] hospital as he was waiting to get the money from his father for his meds.  Pt denies suicidal ideations stating that he was not trying to kill himself when he swallowed the "2.5 gram bag of meth" and was only trying to get rid of the meth as there was a  behind him and he did not want to get arrested and go to [facility].  Pt reports hx of treatment for drugs once at [facility], twice at [facility] and once at [facility] in [place] in the past three years.  Pt verified his PCP as Dr. Fabian in [place], [place] and stated that he has [employer] insurance through his father.  Pt called his grandmother who was going to bring the card to the hospital.      2:20 p.m - Plan for CM to call for psych placement when pt ;is medically ready.  Plan for surgery tonight..        07/03/17 1215   Discharge Assessment   Assessment Type Discharge Planning Assessment   Confirmed/corrected address and phone number on facesheet? Yes  (phone numberr it [phone])   Assessment information obtained from? Patient   Communicated expected length of stay with patient/caregiver no   Type of Healthcare Directive Received (Denies.  Parents are next of kin. )   If Healthcare Directive is received, is it scanned into Epic? no (comment)   Prior to hospitilization cognitive status: Alert/Oriented   Prior to hospitalization functional status: Independent   Current cognitive status: Alert/Oriented   Current Functional Status: Independent   Arrived From home or self-care   Lives With grandparent(s)   Able to Return to Prior Arrangements " no  (Pt is under a PEC. )   Is patient able to care for self after discharge? Yes   How many people do you have in your home that can help with your care after discharge? 2   Who are your caregiver(s) and their phone number(s)? (grandmother Lin Carbajal, [phone])   Patient's perception of discharge disposition other (comments)  (pt is wanting to go home however is under a PEC. )   Readmission Within The Last 30 Days other (see comments)  (Pt was discharged from a psychiatric hospital[facility] [place], [place] on Thursday, 6-29-17)   Patient currently being followed by outpatient case management? No   Patient currently receives home health services? No   Does the patient currently use HME? No   Patient currently receives private duty nursing? No   Patient currently receives any other outside agency services? No   Equipment Currently Used at Home none   Do you have any problems affording any of your prescribed medications? Yes  (Pt's pharmacy is [facility] in [place], [place].  Pt did not get his meds filled as he was waiting for his father to give him money for the meds. )   Is the patient taking medications as prescribed? no   Do you have any financial concerns preventing you from receiving the healthcare you need? No   Does the patient have transportation to healthcare appointments? Yes   Transportation Available family or friend will provide   On Dialysis? No   Does the patient receive services at the Coumadin Clinic? No   Discharge Plan A Psychiatric hospital   Patient/Family In Agreement With Plan yes

## 2017-07-03 NOTE — PHYSICIAN QUERY
PT Name: Elbert Multani  MR #: [id number]    Physician Query Form - Neurological Condition Clarification       CDS/: Karishma Covington               Contact information:    This form is a permanent document in the medical record.     Query Date: July 3, 2017    By submitting this query, we are merely seeking further clarification of documentation. Please utilize your independent clinical judgment when addressing the question(s) below.    The Medical record contains the following:   Indicators   Supporting Clinical Findings Location in Medical Record   x AMS, Confusion, LOC, etc. In ED, patient is extremely agitated, psychotic, and unable to clearly verbalize or comprehend commands    Severe psychosis,  akasthesia, confusion     7/2 HP   x Acute / Chronic Illness Amphetamine Overdose   7/2 HP    Radiology Findings      Electrolyte Imbalance       x Medication Precedex gtt  Haldol 5mg IM x1  Ativan 4mg IVP x3 doses  Quetiapine 50mg po nightly   7/2- 7/3 MAR  7/2 MAR    7/2- 7/3 MAR   x Treatment One on one Psych observation  Psychiatry consult  Restraints  PEC  ICU   Cardiac monitoring  Cardiac moitoring  Pulse oximetry   7/2 Physician orders               x Other Patient expresses logical thought and remorse, and does not endorse suicidal ideations. Patient was informed of present  condition and advised to rest. Patient stated understanding. AAOx4 7/3 RN note     Provider, please specify the diagnosis or diagnoses associated with above clinical findings.    [  ] Metabolic Encephalopathy    [  x] Toxic Encephalopathy    [  ] Other Encephalopathy    [  ] Unspecified Encephalopathy    [  ] Other (please specify): _____________________________________    [  ] Clinically Undetermined      Please document in your progress notes daily for the duration of treatment until resolved, and  include in your discharge summary.

## 2017-07-04 LAB
ANION GAP SERPL CALC-SCNC: 7 MMOL/L
BUN SERPL-MCNC: 10 MG/DL
CALCIUM SERPL-MCNC: 8.3 MG/DL
CHLORIDE SERPL-SCNC: 107 MMOL/L
CO2 SERPL-SCNC: 26 MMOL/L
CREAT SERPL-MCNC: 0.7 MG/DL
EST. GFR  (AFRICAN AMERICAN): >60 ML/MIN/1.73 M^2
EST. GFR  (NON AFRICAN AMERICAN): >60 ML/MIN/1.73 M^2
GLUCOSE SERPL-MCNC: 90 MG/DL
POTASSIUM SERPL-SCNC: 3.4 MMOL/L
SODIUM SERPL-SCNC: 140 MMOL/L

## 2017-07-04 PROCEDURE — 25000003 PHARM REV CODE 250: Performed by: NURSE PRACTITIONER

## 2017-07-04 PROCEDURE — 36415 COLL VENOUS BLD VENIPUNCTURE: CPT

## 2017-07-04 PROCEDURE — 63600175 PHARM REV CODE 636 W HCPCS: Performed by: HOSPITALIST

## 2017-07-04 PROCEDURE — 20000000 HC ICU ROOM

## 2017-07-04 PROCEDURE — 25000003 PHARM REV CODE 250: Performed by: HOSPITALIST

## 2017-07-04 PROCEDURE — 80048 BASIC METABOLIC PNL TOTAL CA: CPT

## 2017-07-04 PROCEDURE — 25000003 PHARM REV CODE 250: Performed by: PSYCHIATRY & NEUROLOGY

## 2017-07-04 RX ORDER — POTASSIUM CHLORIDE 20 MEQ/1
40 TABLET, EXTENDED RELEASE ORAL ONCE
Status: COMPLETED | OUTPATIENT
Start: 2017-07-04 | End: 2017-07-04

## 2017-07-04 RX ADMIN — KETOROLAC TROMETHAMINE 30 MG: 30 INJECTION, SOLUTION INTRAMUSCULAR at 12:07

## 2017-07-04 RX ADMIN — HYDROCODONE BITARTATE AND ACETAMINOPHEN 1 TABLET: 5; 325 TABLET ORAL at 12:07

## 2017-07-04 RX ADMIN — ENOXAPARIN SODIUM 40 MG: 100 INJECTION SUBCUTANEOUS at 08:07

## 2017-07-04 RX ADMIN — CLONIDINE HYDROCHLORIDE 0.1 MG: 0.1 TABLET ORAL at 01:07

## 2017-07-04 RX ADMIN — ACETAMINOPHEN 1000 MG: 500 TABLET, FILM COATED ORAL at 06:07

## 2017-07-04 RX ADMIN — OXYCODONE HYDROCHLORIDE AND ACETAMINOPHEN 500 MG: 500 TABLET ORAL at 09:07

## 2017-07-04 RX ADMIN — CLONIDINE HYDROCHLORIDE 0.1 MG: 0.1 TABLET ORAL at 09:07

## 2017-07-04 RX ADMIN — STANDARDIZED SENNA CONCENTRATE AND DOCUSATE SODIUM 1 TABLET: 8.6; 5 TABLET, FILM COATED ORAL at 09:07

## 2017-07-04 RX ADMIN — POTASSIUM CHLORIDE 40 MEQ: 1500 TABLET, EXTENDED RELEASE ORAL at 06:07

## 2017-07-04 RX ADMIN — HYDROCODONE BITARTATE AND ACETAMINOPHEN 1 TABLET: 5; 325 TABLET ORAL at 10:07

## 2017-07-04 RX ADMIN — HYDROCODONE BITARTATE AND ACETAMINOPHEN 1 TABLET: 5; 325 TABLET ORAL at 06:07

## 2017-07-04 RX ADMIN — CLONIDINE HYDROCHLORIDE 0.1 MG: 0.1 TABLET ORAL at 12:07

## 2017-07-04 RX ADMIN — OXYCODONE HYDROCHLORIDE AND ACETAMINOPHEN 500 MG: 500 TABLET ORAL at 08:07

## 2017-07-04 RX ADMIN — KETOROLAC TROMETHAMINE 30 MG: 30 INJECTION, SOLUTION INTRAMUSCULAR at 09:07

## 2017-07-04 RX ADMIN — SERTRALINE HYDROCHLORIDE 50 MG: 50 TABLET ORAL at 08:07

## 2017-07-04 RX ADMIN — CLONIDINE HYDROCHLORIDE 0.1 MG: 0.1 TABLET ORAL at 06:07

## 2017-07-04 RX ADMIN — QUETIAPINE FUMARATE 100 MG: 100 TABLET, FILM COATED ORAL at 09:07

## 2017-07-04 RX ADMIN — PANTOPRAZOLE SODIUM 40 MG: 40 TABLET, DELAYED RELEASE ORAL at 08:07

## 2017-07-04 RX ADMIN — STANDARDIZED SENNA CONCENTRATE AND DOCUSATE SODIUM 1 TABLET: 8.6; 5 TABLET, FILM COATED ORAL at 08:07

## 2017-07-04 NOTE — NURSING
2000 - Patient returning from R shoulder reduction, PACU RN at bedside for report. Patient presents to room with ACE wrap to RUE/ shoulder girdle on assessment. VSS, and patient reports mild pain, manageable.     2030 - Patient c/o R should pain and has removed his ACE wrap from R shoulder girdle. Patient states he feels that his shoulder was wrapped incorrectly and it feels better unwrapped, and he states that he didn't want his shoulder to set incorrectly so he removed his ACE wrap. Patient also states that he feels his shoulder may be displaced again.     Advised patient that removing the ACE wrap may complicate his shoulder displacement, and although it hurts, it is helping to minimize ROM so that his shoulder girdle can rest and rehabilitate.    On R shoulder palpation, all bony prominences are intact and consistent with typical anatomical landmarks. Advised patient that soreness/tenderness after a reduction is to be anticipated, and educated patient on anatomy and physiology r/t soft tissue injury and joint displacement, and complications/normal findings. Advised patient to minimize ROM until the shoulder can be rewrapped. Patient stated understanding.     PRN meds administered, see MAR. Informed patient that if he remains in distress in the am, we will order a R shoulder XRAY, and move forward pending results. Patient stated understanding.    All safety measures in place at this time. Sitter at bedside for CEC. Will continue to monitor.

## 2017-07-04 NOTE — PROGRESS NOTES
WILLIAN contacted Ms Mendez andres/ [facility] ([place]) [phone].  Unable to accept pt today due to staffing issues.  They may be able to accept pt in morning.    WILLIAN contacted [facility]--unable to accept, no beds.    [facility]--unable to accept, no beds available.    WILLIAN contacted Edda camops/[facility] ([place]) [phone].  Request packet.  WILLIAN faxed packet (face sheet, psych notes, H&P, meds, labs, CEC/PEC) to [facility] @ [phone].  WILLIAN requested [facility] to contact charge RN @ [phone] if pt accepted or additional info needed.

## 2017-07-04 NOTE — PROGRESS NOTES
"Progress Note  Hospital Medicine    Admit Date: 7/2/2017    SUBJECTIVE:     Follow-up For:  Amphetamine overdose      Interval history (See H&P for complete P,F,SHx) : Patient back to his mental baseline this AM. Discussed with psych- patient PEC'd. Underwent closed reduction of shoulder overnight but refusing to wear sling.    Review of Systems:   Pain scale: 0/10  Extrem- Pain/Swelling in arm        OBJECTIVE:     Vital Signs Range (Last 24H):  Temp:  [97.5 °F (36.4 °C)-99.1 °F (37.3 °C)]   Pulse:  []   Resp:  [11-39]   BP: ()/(50-96)   SpO2:  [85 %-100 %]     I & O (Last 24H):    Intake/Output Summary (Last 24 hours) at 07/04/17 1230  Last data filed at 07/04/17 0800   Gross per 24 hour   Intake              440 ml   Output                0 ml   Net              440 ml       Estimated body mass index is 22.27 kg/m² as calculated from the following:    Height as of this encounter: 5' 10" (1.778 m).    Weight as of this encounter: 70.4 kg (155 lb 3.3 oz).    Physical Exam:  General- Patient alert and oriented x3 in NAD  HEENT- PERRLA, EOMI, OP clear, MMM  Neck- No JVD, Lymphadenopathy, Thyromegaly  CV- Regular rate and rhythm, No Murmur/cortez/rubs  Resp- Lungs CTA Bilaterally, No increased WOB  GI- Non tender/non-distended, BS normoactive x4 quads, no HSM  Extrem- No cyanosis, clubbing, edema. + R shoulder with severe pain on movement.    Laboratory/Diagnostic Data:  Reviewed and noted in plan where applicable- Please see chart for full lab data.    Medications:  Medication list was reviewed and changes noted under Assessment/Plan.    ASSESSMENT/PLAN:     Active Problems:    Active Hospital Problems    Diagnosis  POA    *Amphetamine overdose [T43.621A]-  Improved. Back to baseline. Question of intentionality. PEC'd currently. Medically cleared for placement.  Yes     Priority: 1 - High    Suicide attempt by drug ingestion [T50.902A]-  See above.   Yes    Severe episode of recurrent major depressive " disorder, without psychotic features [F33.2]-  Patient didn't fill his home zoloft and seroquel. Will start them now. Psych consulted. On meds per psych guidance.  Yes    Methamphetamine dependence, continuous [F15.20]-  See above.  Yes    Tobacco abuse disorder [Z72.0]- Dangers of cigarette smoking were reviewed with patient in detail and patient was encouraged to quit. Nicotine replacement options were discussed.  Yes    Dislocation of right shoulder joint [S43.004A]-  S/p reduction of R shoulder by ortho. Patient refusing to wear sling.  No      Resolved Hospital Problems    Diagnosis Date Resolved POA   No resolved problems to display.     Disposition- Inpatient psych- medically cleared for placement.    VTE Risk Mitigation         Ordered     enoxaparin injection 40 mg  Daily     Route:  Subcutaneous        07/02/17 1633     Low Risk of VTE  Once      07/02/17 1701

## 2017-07-04 NOTE — PLAN OF CARE
Problem: Patient Care Overview  Goal: Plan of Care Review  Pt medically cleared, awaiting placement to in pt facility. He's remained free from falls/injuries this shift. Denies suicidal ideations.

## 2017-07-04 NOTE — PROGRESS NOTES
WILLIAN contacted [facility] (Ms. Simms) [phone] re: PEC/CEC referral.   WILLIAN faxed PEC/CEC, face sheet, psych notes, H&P, labs, meds to [email or web address] @ [phone].    [facility] contact info:  [phone].   Address is [address] [place] [address].

## 2017-07-04 NOTE — PROGRESS NOTES
"Progress Note  Hospital Medicine    Admit Date: 7/2/2017    SUBJECTIVE:     Follow-up For:  Amphetamine overdose      Interval history (See H&P for complete P,F,SHx) : Patient back to his mental baseline this AM. Discussed with psych- patient PEC'd. Noted R shoulder pain- patient shoulder dislocated- fixed this PM by Dr. Peraza.    Review of Systems:   Pain scale: 0/10  Extrem- Pain/Swelling in arm        OBJECTIVE:     Vital Signs Range (Last 24H):  Temp:  [97.5 °F (36.4 °C)-99.1 °F (37.3 °C)]   Pulse:  []   Resp:  [7-38]   BP: (102-198)/()   SpO2:  [98 %-100 %]     I & O (Last 24H):    Intake/Output Summary (Last 24 hours) at 07/03/17 2115  Last data filed at 07/03/17 1829   Gross per 24 hour   Intake          2041.04 ml   Output                0 ml   Net          2041.04 ml       Estimated body mass index is 22.27 kg/m² as calculated from the following:    Height as of this encounter: 5' 10" (1.778 m).    Weight as of this encounter: 70.4 kg (155 lb 3.3 oz).    Physical Exam:  General- Patient alert and oriented x3 in NAD  HEENT- PERRLA, EOMI, OP clear, MMM  Neck- No JVD, Lymphadenopathy, Thyromegaly  CV- Regular rate and rhythm, No Murmur/cortez/rubs  Resp- Lungs CTA Bilaterally, No increased WOB  GI- Non tender/non-distended, BS normoactive x4 quads, no HSM  Extrem- No cyanosis, clubbing, edema. + R shoulder with severe pain on movement.    Laboratory/Diagnostic Data:  Reviewed and noted in plan where applicable- Please see chart for full lab data.    Medications:  Medication list was reviewed and changes noted under Assessment/Plan.    ASSESSMENT/PLAN:     Active Problems:    Active Hospital Problems    Diagnosis  POA    *Amphetamine overdose [T43.621A]-  Improved. Back to baseline. Question of intentionality. PEC'd currently.  Yes     Priority: 1 - High    Suicide attempt by drug ingestion [T50.622A]-  See above.   Yes    Severe episode of recurrent major depressive disorder, without psychotic " features [F33.2]-  Patient didn't fill his home zoloft and seroquel. Will start them now. Psych consulted. Will defer to their ,emt/  Yes    Methamphetamine dependence, continuous [F15.20]-  See above.  Yes    Tobacco abuse disorder [Z72.0]- Dangers of cigarette smoking were reviewed with patient in detail and patient was encouraged to quit. Nicotine replacement options were discussed.  Yes    Dislocation of right shoulder joint [S43.004A]-  S/p reduction of R shoulder by ortho.  No      Resolved Hospital Problems    Diagnosis Date Resolved POA   No resolved problems to display.     Disposition- Inpatient psych.    VTE Risk Mitigation         Ordered     enoxaparin injection 40 mg  Daily     Route:  Subcutaneous        07/02/17 1633     Low Risk of VTE  Once      07/02/17 1701

## 2017-07-04 NOTE — PROGRESS NOTES
Pt awake alert and oriented. Has removed sling and swath because it was uncomfortable.    Reports pain controlled. He is neurovascular intact in right arm.    Pt will need follow up with orthopedics for shoulder stabilization.    Ready for d/c from ortho standpoint. Recommend he remain in sling and swath.

## 2017-07-04 NOTE — PLAN OF CARE
Problem: Patient Care Overview  Goal: Plan of Care Review  Outcome: Ongoing (interventions implemented as appropriate)  Patient VSS and without complication this shift. Patient does not endorse suicidal ideations at this time; presently CEC'd and awaiting external psych placement. Patient anticipating XR of RUE/shoulder girdle this morning r/t displacement pain and discomfort, per patient. Pain managed with current med regiment. Will follow.      Abnormal labs reported to NP, see results review. Awaiting new orders for potassium and calcium replacement, PRN.     All safety measures in place at this time. Will continue to monitor.

## 2017-07-04 NOTE — PROGRESS NOTES
Pt took sling off R arm last PM and did not want to put it back on. This AM when Dr. Griffiths rounded he spoke to pt about importance of keeping sling on for healing. Pt stated he would put it sling back on. I reapplied sling and ace wraps to pt's R shoulder. He left it on about 30 minutes and then took it off. Pt once again re-instructed on importance of keeping sling on to keep arm/shoulder immobilized for healing, pt refused to put it back on at this time.    Aristeo Caban RN

## 2017-07-04 NOTE — PLAN OF CARE
"Dr knox here updated on pt status and b/p wnl of basline of npo with no meds  Has meds po in icu for b/p "PT is released from pacu to icu bed 12 r shoulder in ace wrap and arm sling  Pain now "much better pt states    "

## 2017-07-05 LAB
ANION GAP SERPL CALC-SCNC: 6 MMOL/L
BUN SERPL-MCNC: 9 MG/DL
CALCIUM SERPL-MCNC: 9 MG/DL
CHLORIDE SERPL-SCNC: 106 MMOL/L
CO2 SERPL-SCNC: 28 MMOL/L
CREAT SERPL-MCNC: 0.8 MG/DL
EST. GFR  (AFRICAN AMERICAN): >60 ML/MIN/1.73 M^2
EST. GFR  (NON AFRICAN AMERICAN): >60 ML/MIN/1.73 M^2
GLUCOSE SERPL-MCNC: 99 MG/DL
POTASSIUM SERPL-SCNC: 3.7 MMOL/L
SODIUM SERPL-SCNC: 140 MMOL/L

## 2017-07-05 PROCEDURE — 63600175 PHARM REV CODE 636 W HCPCS: Performed by: HOSPITALIST

## 2017-07-05 PROCEDURE — 80048 BASIC METABOLIC PNL TOTAL CA: CPT

## 2017-07-05 PROCEDURE — 20000000 HC ICU ROOM

## 2017-07-05 PROCEDURE — 25000003 PHARM REV CODE 250: Performed by: NURSE PRACTITIONER

## 2017-07-05 PROCEDURE — 25000003 PHARM REV CODE 250: Performed by: HOSPITALIST

## 2017-07-05 PROCEDURE — 25000003 PHARM REV CODE 250: Performed by: PSYCHIATRY & NEUROLOGY

## 2017-07-05 PROCEDURE — 36415 COLL VENOUS BLD VENIPUNCTURE: CPT

## 2017-07-05 RX ADMIN — CLONIDINE HYDROCHLORIDE 0.1 MG: 0.1 TABLET ORAL at 10:07

## 2017-07-05 RX ADMIN — KETOROLAC TROMETHAMINE 30 MG: 30 INJECTION, SOLUTION INTRAMUSCULAR at 04:07

## 2017-07-05 RX ADMIN — STANDARDIZED SENNA CONCENTRATE AND DOCUSATE SODIUM 1 TABLET: 8.6; 5 TABLET, FILM COATED ORAL at 08:07

## 2017-07-05 RX ADMIN — PANTOPRAZOLE SODIUM 40 MG: 40 TABLET, DELAYED RELEASE ORAL at 08:07

## 2017-07-05 RX ADMIN — SERTRALINE HYDROCHLORIDE 50 MG: 50 TABLET ORAL at 08:07

## 2017-07-05 RX ADMIN — CLONIDINE HYDROCHLORIDE 0.1 MG: 0.1 TABLET ORAL at 09:07

## 2017-07-05 RX ADMIN — OXYCODONE HYDROCHLORIDE AND ACETAMINOPHEN 500 MG: 500 TABLET ORAL at 09:07

## 2017-07-05 RX ADMIN — QUETIAPINE FUMARATE 100 MG: 100 TABLET, FILM COATED ORAL at 09:07

## 2017-07-05 RX ADMIN — OXYCODONE HYDROCHLORIDE AND ACETAMINOPHEN 500 MG: 500 TABLET ORAL at 08:07

## 2017-07-05 RX ADMIN — STANDARDIZED SENNA CONCENTRATE AND DOCUSATE SODIUM 1 TABLET: 8.6; 5 TABLET, FILM COATED ORAL at 09:07

## 2017-07-05 RX ADMIN — ENOXAPARIN SODIUM 40 MG: 100 INJECTION SUBCUTANEOUS at 08:07

## 2017-07-05 NOTE — PLAN OF CARE
Vilma at [facility], [phone] requested pt's info be faxed to her.  Hard faxed the packet to her at [phone]..    Julienne at [facility], [phone] requested I hard fax her the packet to [phone].  Faxed her the packet.    Kandy at [facility] in [place], [phone] requested I fax her the packet to [phone]. She denied that they had a bed however thought [facility] may have a bed.  Faxed her the packet for review.      07/05/17 1507   Discharge Reassessment   Assessment Type Discharge Planning Reassessment

## 2017-07-05 NOTE — BRIEF OP NOTE
[facility]  Shoulder Arthroscopy  Procedure Note    SUMMARY     Date of Procedure: 7/3/2017     Procedure: Procedure(s) (LRB):  REDUCTION-CLOSED-SHOULDER (Right)     Surgeon(s) and Role:     * Juan Peraza MD - Primary    Assisting Surgeon: None    Indications: Persistent right shoulder pain with dislocation    Pre-Operative Diagnosis: right shoulder dislocation    Post-Operative Diagnosis: same    Anesthesia: General    Technical Procedures Used: closed reduction    Description of the Findings of the Procedure:     The patient was seen in the ICU. The risks, benefits, complications, treatment options, and expected outcomes were discussed with the patient. The risks and potential complications of their problem and purposed treatment including but not limited to failure to fully relieve pain, infection, neurovascular compromise, complications from anesthesia, stiff shoulder, and failure of surgery with continued pain.  The patient concurred with the proposed plan, giving informed consent.  The site of surgery properly noted/marked. The patient was taken to Operating Room, identified as Elbert Carbajal and the procedure verified as Closed reduction right shoulder. A Time Out was held and the above information confirmed.    The patient was brought to the operating room, placed on the operating table in a supine position. After administration of sedation, the patient was positioned on the Operating Room table. A closed reduction was performed with a palpable clunk as the shoulder relocated. We then verified under C arm that the should was reduced. A sling and swatch was placed. The patient was awoken from anesthesia and brought back to the ICU in good condition.        Significant Surgical Tasks Conducted by the Assistant(s), if Applicable: closed reduction    Complications: None; patient tolerated the procedure well.    Total IV Fluids:     Estimated Blood Loss (EBL):            Drains:  none    Implants: none    Specimens: none            Condition: stable    Disposition: ICU - extubated and stable.    Attestation: I was present and scrubbed for the entire procedure.

## 2017-07-05 NOTE — PLAN OF CARE
Phone contact with Melissa at [facility], [phone] who was unaware of pt.  She stated she would check and get update from their staff and see if they have a bed for pt today.  Awaiting call back from her.      Johanna at [facility], [phone] stated they are full except for a female bed.    Savannah at [facility], [phone] reports no beds and states [facility] could have up to 3 beds..    Spoke to Kandy at [facility], [phone] who requested the packet be faxed to [phone] through [facility].     Manny at [facility], [phone]  States she is unaware if they have beds.  She verified she would just need the PEC/CEC faxed to her as she can pull everything off Epic.

## 2017-07-05 NOTE — PLAN OF CARE
Called [facility] in [place] for update on status of placement.  Was informed Crystal was gone for the day.  Updated Allen, charge nurse in ICU on status of psych placement.  Plan for him to update pt's nurse.

## 2017-07-05 NOTE — PLAN OF CARE
Problem: Patient Care Overview  Goal: Plan of Care Review  Outcome: Ongoing (interventions implemented as appropriate)  No change in care plan at this time. VSS, patient medically cleared to be transferred to external psych facility. Patient ambulating, without falls this shift. HEENT WNL, gross motor intact, no c/o. Patient resting quietly with sitter at bedside for CEC, and continues to deny suicidal ideation.    All safety measures in place at this time. Will continue to monitor.

## 2017-07-05 NOTE — PLAN OF CARE
Met with pt who was alert, oriented, denies suicidal ideations or any needs at this time.  Updated him on status of placement.  Pt  Verified that his father and grandmother has come by to visit him.  Spoke to Rosa HERNANDEZ at [facility], [phone] to updated her on pt's case.      10:00 a.m. - Samuel at [facility], [phone] requested pt's packet be faxed for review.     Mei at [facility], [phone] requested I fax her the packet.  She was unable to tell me if they have beds.      Kailee at [facility] [phone] reports no beds however possible discharges later.  Requested a packet be faxed to her at fax [phone].    Zafar at [facility],[phone] reports no beds however expects discharges later today and requested a packet be faxed to him.      Faxed packets through [facility] to  [place], [facility], [facility], [place], [facility], [facility].      07/05/17 0940   Discharge Reassessment   Assessment Type Discharge Planning Reassessment   Can the patient answer the patient profile reliably? Yes, cognitively intact

## 2017-07-05 NOTE — PLAN OF CARE
Problem: Patient Care Overview  Goal: Plan of Care Review  Pt remains stable. He's CEC'd w/ plans to go to in pt facility once a bed is available. Risk sitter has remained in place for 1:1 observation. Pt currently denies any suicidal ideations. He's remained free from falls/injuries this shift.

## 2017-07-05 NOTE — OP NOTE
[facility]  Shoulder Arthroscopy  Procedure Note     SUMMARY      Date of Procedure: 7/3/2017      Procedure: Procedure(s) (LRB):  REDUCTION-CLOSED-SHOULDER (Right)      Surgeon(s) and Role:     * Juan Peraza MD - Primary     Assisting Surgeon: None     Indications: Persistent right shoulder pain with dislocation     Pre-Operative Diagnosis: right shoulder dislocation     Post-Operative Diagnosis: same     Anesthesia: General     Technical Procedures Used: closed reduction     Description of the Findings of the Procedure:     The patient was seen in the ICU. The risks, benefits, complications, treatment options, and expected outcomes were discussed with the patient. The risks and potential complications of their problem and purposed treatment including but not limited to failure to fully relieve pain, infection, neurovascular compromise, complications from anesthesia, stiff shoulder, and failure of surgery with continued pain.  The patient concurred with the proposed plan, giving informed consent.  The site of surgery properly noted/marked. The patient was taken to Operating Room, identified as Elbert Carbajal and the procedure verified as Closed reduction right shoulder. A Time Out was held and the above information confirmed.     The patient was brought to the operating room, placed on the operating table in a supine position. After administration of sedation, the patient was positioned on the Operating Room table. A closed reduction was performed with a palpable clunk as the shoulder relocated. We then verified under C arm that the should was reduced. A sling and swatch was placed. The patient was awoken from anesthesia and brought back to the ICU in good condition.           Significant Surgical Tasks Conducted by the Assistant(s), if Applicable: closed reduction     Complications: None; patient tolerated the procedure well.     Total IV Fluids:      Estimated Blood Loss (EBL):             Drains: none     Implants: none     Specimens: none            Condition: stable     Disposition: ICU - extubated and stable.     Attestation: I was present and scrubbed for the entire procedure.               Revision History            [facility]  Shoulder Arthroscopy  Procedure Note     SUMMARY      Date of Procedure: 7/3/2017      Procedure: Procedure(s) (LRB):  REDUCTION-CLOSED-SHOULDER (Right)      Surgeon(s) and Role:     * Juan Peraza MD - Primary     Assisting Surgeon: None     Indications: Persistent right shoulder pain with dislocation     Pre-Operative Diagnosis: right shoulder dislocation     Post-Operative Diagnosis: same     Anesthesia: General     Technical Procedures Used: closed reduction     Description of the Findings of the Procedure:     The patient was seen in the ICU. The risks, benefits, complications, treatment options, and expected outcomes were discussed with the patient. The risks and potential complications of their problem and purposed treatment including but not limited to failure to fully relieve pain, infection, neurovascular compromise, complications from anesthesia, stiff shoulder, and failure of surgery with continued pain.  The patient concurred with the proposed plan, giving informed consent.  The site of surgery properly noted/marked. The patient was taken to Operating Room, identified as Elbert Carbajal and the procedure verified as Closed reduction right shoulder. A Time Out was held and the above information confirmed.     The patient was brought to the operating room, placed on the operating table in a supine position. After administration of sedation, the patient was positioned on the Operating Room table. A closed reduction was performed with a palpable clunk as the shoulder relocated. We then verified under C arm that the should was reduced. A sling and swatch was placed. The patient was awoken from anesthesia and brought back to the ICU in good  condition.           Significant Surgical Tasks Conducted by the Assistant(s), if Applicable: closed reduction     Complications: None; patient tolerated the procedure well.     Total IV Fluids:      Estimated Blood Loss (EBL):            Drains: none     Implants: none     Specimens: none            Condition: stable     Disposition: ICU - extubated and stable.     Attestation: I was present and scrubbed for the entire procedure.               Revision History

## 2017-07-05 NOTE — PLAN OF CARE
Manny at [facility] and Paul at [facility] report no beds.  Cierra at [facility] stated they were still reviewing packets.  Kandy at [facility] stated she would have someone call us back and would call ICU if after 4:30 p.m. If they could admit pt.  Kandy at [facility] stated they had not review the packet as yet.  Will call ICU if they can admit pt after 4:30 p.m. Adalberto at [facility] reports no beds.  Melissa at [facility] in [place] denies that she received the packet. [facility] indicates that the packet went through.  Hard faxed her the packet.  Zafar at [facility] states when he had beds he would review pt's record and if th4ey can admit pt he will call ICU.

## 2017-07-05 NOTE — PROGRESS NOTES
"Progress Note  Hospital Medicine    Admit Date: 7/2/2017    SUBJECTIVE:     Follow-up For:  Amphetamine overdose      Interval history (See H&P for complete P,F,SHx) : Patient at his mental baseline. Discussed with psych- patient PEC'd. Underwent closed reduction of shoulder 7/3/17 but refusing to wear sling.    Review of Systems:   Pain scale: 0/10  Extrem- Pain/Swelling in arm        OBJECTIVE:     Vital Signs Range (Last 24H):  Temp:  [97.8 °F (36.6 °C)-98.4 °F (36.9 °C)]   Pulse:  [49-83]   Resp:  [12-31]   BP: ()/(52-81)   SpO2:  [95 %-100 %]     I & O (Last 24H):    Intake/Output Summary (Last 24 hours) at 07/05/17 0955  Last data filed at 07/04/17 1700   Gross per 24 hour   Intake             1080 ml   Output                0 ml   Net             1080 ml       Estimated body mass index is 22.27 kg/m² as calculated from the following:    Height as of this encounter: 5' 10" (1.778 m).    Weight as of this encounter: 70.4 kg (155 lb 3.3 oz).    Physical Exam:  General- Patient alert and oriented x3 in NAD  HEENT- PERRLA, EOMI, OP clear, MMM  Neck- No JVD, Lymphadenopathy, Thyromegaly  CV- Regular rate and rhythm, No Murmur/cortez/rubs  Resp- Lungs CTA Bilaterally, No increased WOB  GI- Non tender/non-distended, BS normoactive x4 quads, no HSM  Extrem- No cyanosis, clubbing, edema. + R shoulder with severe pain on movement.    Laboratory/Diagnostic Data:  Reviewed and noted in plan where applicable- Please see chart for full lab data.    Medications:  Medication list was reviewed and changes noted under Assessment/Plan.    ASSESSMENT/PLAN:     Active Problems:    Active Hospital Problems    Diagnosis  POA    *Amphetamine overdose [T43.621A]-  Improved. Back to baseline. Question of intentionality. PEC'd currently. Medically cleared for placement.  Yes     Priority: 1 - High    Suicide attempt by drug ingestion [T50.902A]-  See above.   Yes    Severe episode of recurrent major depressive disorder, without " psychotic features [F33.2]-  Patient didn't fill his home zoloft and seroquel. Psych consulted. On meds per psych guidance.  Yes    Methamphetamine dependence, continuous [F15.20]-  See above.  Yes    Tobacco abuse disorder [Z72.0]- Dangers of cigarette smoking were reviewed with patient in detail and patient was encouraged to quit. Nicotine replacement options were discussed.  Yes    Dislocation of right shoulder joint [S43.004A]-  S/p reduction of R shoulder by ortho. Patient refusing to wear sling.  No      Resolved Hospital Problems    Diagnosis Date Resolved POA   No resolved problems to display.     Disposition- Inpatient psych- medically cleared for placement.    VTE Risk Mitigation         Ordered     enoxaparin injection 40 mg  Daily     Route:  Subcutaneous        07/02/17 1633     Low Risk of VTE  Once      07/02/17 1701

## 2017-07-06 VITALS
HEART RATE: 82 BPM | SYSTOLIC BLOOD PRESSURE: 134 MMHG | HEIGHT: 70 IN | RESPIRATION RATE: 18 BRPM | WEIGHT: 155.19 LBS | TEMPERATURE: 98 F | BODY MASS INDEX: 22.22 KG/M2 | DIASTOLIC BLOOD PRESSURE: 87 MMHG | OXYGEN SATURATION: 100 %

## 2017-07-06 PROCEDURE — 63600175 PHARM REV CODE 636 W HCPCS: Performed by: HOSPITALIST

## 2017-07-06 PROCEDURE — 25000003 PHARM REV CODE 250: Performed by: PSYCHIATRY & NEUROLOGY

## 2017-07-06 PROCEDURE — 25000003 PHARM REV CODE 250: Performed by: HOSPITALIST

## 2017-07-06 PROCEDURE — 25000003 PHARM REV CODE 250: Performed by: NURSE PRACTITIONER

## 2017-07-06 RX ORDER — ASCORBIC ACID 500 MG
500 TABLET ORAL 2 TIMES DAILY
COMMUNITY
Start: 2017-07-06

## 2017-07-06 RX ORDER — PANTOPRAZOLE SODIUM 40 MG/1
40 TABLET, DELAYED RELEASE ORAL DAILY
Qty: 30 TABLET | Refills: 0 | OUTPATIENT
Start: 2017-07-06 | End: 2018-07-06

## 2017-07-06 RX ORDER — QUETIAPINE FUMARATE 100 MG/1
100 TABLET, FILM COATED ORAL NIGHTLY
Qty: 30 TABLET | Refills: 0 | OUTPATIENT
Start: 2017-07-06 | End: 2018-07-06

## 2017-07-06 RX ORDER — CLONIDINE HYDROCHLORIDE 0.1 MG/1
0.1 TABLET ORAL 3 TIMES DAILY
Qty: 90 TABLET | Refills: 0 | OUTPATIENT
Start: 2017-07-06 | End: 2018-07-06

## 2017-07-06 RX ORDER — SERTRALINE HYDROCHLORIDE 50 MG/1
50 TABLET, FILM COATED ORAL DAILY
Qty: 30 TABLET | Refills: 0 | OUTPATIENT
Start: 2017-07-06 | End: 2018-07-06

## 2017-07-06 RX ORDER — ACETAMINOPHEN 500 MG
1000 TABLET ORAL EVERY 6 HOURS PRN
Refills: 0 | COMMUNITY
Start: 2017-07-06

## 2017-07-06 RX ADMIN — OXYCODONE HYDROCHLORIDE AND ACETAMINOPHEN 500 MG: 500 TABLET ORAL at 08:07

## 2017-07-06 RX ADMIN — STANDARDIZED SENNA CONCENTRATE AND DOCUSATE SODIUM 1 TABLET: 8.6; 5 TABLET, FILM COATED ORAL at 08:07

## 2017-07-06 RX ADMIN — KETOROLAC TROMETHAMINE 30 MG: 30 INJECTION, SOLUTION INTRAMUSCULAR at 08:07

## 2017-07-06 RX ADMIN — CLONIDINE HYDROCHLORIDE 0.1 MG: 0.1 TABLET ORAL at 02:07

## 2017-07-06 RX ADMIN — SERTRALINE HYDROCHLORIDE 50 MG: 50 TABLET ORAL at 08:07

## 2017-07-06 RX ADMIN — ENOXAPARIN SODIUM 40 MG: 100 INJECTION SUBCUTANEOUS at 08:07

## 2017-07-06 RX ADMIN — PANTOPRAZOLE SODIUM 40 MG: 40 TABLET, DELAYED RELEASE ORAL at 08:07

## 2017-07-06 NOTE — PROGRESS NOTES
Faxed pt's AVS and discharge order to [facility].  Updated eNelima, pt's nurse.      [facility] called for transport [phone].

## 2017-07-06 NOTE — PLAN OF CARE
Noris gene celsa Aggarwal [phone] states she is waiting to hear back from her doctor on the case.      Analy at [facility] [phone] reports no beds.     Chapin at [facility], [phone] option 1 stated she will check and call me back.     Crystal at [facility] in [place], [phone] stated she will review the packet and call me back.    Melissa at [facility], [phone] states she will have to check on status of admit and call me back.     Candelaria at [facility], [phone] states they do not have a male bed.     Rita at [facility], [phone] stated they are reviewing packets and if pt meets criteria they will call me back.  I asked if she would please call me back so I would know if pt was appropriate for their unit.     Zafar at [facility], [phone] requested that I hard fax him the referrals and he would review them and call me back.  Will hard fax the referral.     07/06/17 1153   Discharge Reassessment   Assessment Type Discharge Planning Reassessment

## 2017-07-06 NOTE — PROGRESS NOTES
Melissa at [facility] in [place], [phone] states pt has been declined as he is too medical for their unit.   Updated Neelima in ICU.

## 2017-07-06 NOTE — PROGRESS NOTES
Pt to [facility] in [place] via [facility] transportation.  No complaints.  Phone number of facility provided to pt who wanted to give it to his mother.

## 2017-07-06 NOTE — PLAN OF CARE
Julienne at [facility], [phone] states she will check on status of admit and call me back.     Kyra at [facility], [phone] states they are expecting discharges today and if they have an opening she will call me back.    Artur at [facility], [phone] reports no beds.      07/06/17 1246   Discharge Reassessment   Assessment Type Discharge Planning Reassessment

## 2017-07-06 NOTE — PLAN OF CARE
Manisha at [facility], [phone] reports no beds.      Faxed pt's packet of information through  [facility], [facility], [facility] [place], [facility], [facility], [facility], [facility] in [place] and [facility] in [place].      07/06/17 0846   Discharge Reassessment   Assessment Type Discharge Planning Assessment

## 2017-07-06 NOTE — PROGRESS NOTES
Zafar at [facility] [phone] states that Dr. Moreno as accepted pt for admission.  Okay to call report to [facility] at [phone] ext [phone].   Updated Dr. Griffiths and pt's nurse Neelima.

## 2017-07-07 ENCOUNTER — TELEPHONE (OUTPATIENT)
Dept: MEDSURG UNIT | Facility: HOSPITAL | Age: 27
End: 2017-07-07

## 2017-07-07 NOTE — HOSPITAL COURSE
Patient was admitted with acute meth overdose. He was bridged from his toxicity and there was evidence of intentionality to his overdose and he was PEC'd and psych was consulted. He was started on medication for acute depression with psychosis and was transitioned to inpatient psych for further care after being medically cleared.    Patient and/or family was seen on day of discharge by myself and was examined. They were stable for discharge. Discharge plan, follow up instructions, and contacts in case of further needs were discussed in detail.

## 2017-07-07 NOTE — DISCHARGE SUMMARY
"[facility] Medicine  Discharge Summary      Patient Name: Elbert Carbajal  MRN: [id number]  Admission Date: 7/2/2017  Hospital Length of Stay: 4 days  Discharge Date and Time: 7/6/2017  3:45 PM  Attending Physician: No att. providers found   Discharging Provider: Buddy Grifftihs MD  Primary Care Provider: Lizandro Fabian      HPI:   The patient is a 26 y.o. male who is presenting per EMS with the acute onset of an amphetamine OD. Per EMS, the pt took x 2 grams of ICE (cystal methamphetamine) approximately 1 hour before arrival to the ED. The pt states that he took "way more than his normal .5 grams" in an attempt to commit suicide but now adamantly denies any desire to die. The pt additionally reports that he has been "out of his antidepressants" and cannot afford the rx. Per EMS the pt is currently living with his father and two children in a hotel.     In ED, patient is extremely agitated, psychotic, and unable to clearly verbalize or comprehend commands. He was heavily sedated, and given activated charcoal to avoid harming himself and was admitted to hospital medicine for management of his overdose. I am unable to do a medical history/ Soc Hx and Fam Hx d/t patient confusion and agitation.    Procedure(s) (LRB):  REDUCTION-CLOSED-SHOULDER (Right)      Indwelling Lines/Drains at time of discharge:   Lines/Drains/Airways          No matching active lines, drains, or airways        Hospital Course:   Patient was admitted with acute meth overdose. He was bridged from his toxicity and there was evidence of intentionality to his overdose and he was PEC'd and psych was consulted. He was started on medication for acute depression with psychosis and was transitioned to inpatient psych for further care after being medically cleared.    Patient and/or family was seen on day of discharge by myself and was examined. They were stable for discharge. Discharge plan, follow up instructions, and contacts in case " of further needs were discussed in detail.       Consults:   Consults         Status Ordering Provider     IP consult to case management  Once     Provider:  (Not yet assigned)    Completed JOSH AVILA          Significant Diagnostic Studies: Labs:   BMP:   Recent Labs  Lab 07/05/17  0336   GLU 99      K 3.7      CO2 28   BUN 9   CREATININE 0.8   CALCIUM 9.0    and CBC No results for input(s): WBC, HGB, HCT, PLT in the last 48 hours.    Pending Diagnostic Studies:     None        Final Active Diagnoses:    Diagnosis Date Noted POA    PRINCIPAL PROBLEM:  Amphetamine overdose [T43.621A] 07/02/2017 Yes    Suicide attempt by drug ingestion [T50.902A] 07/03/2017 Yes    Severe episode of recurrent major depressive disorder, without psychotic features [F33.2] 07/03/2017 Yes    Methamphetamine dependence, continuous [F15.20] 07/03/2017 Yes    Tobacco abuse disorder [Z72.0] 07/03/2017 Yes    Dislocation of right shoulder joint [S43.004A] 07/03/2017 No      Problems Resolved During this Admission:    Diagnosis Date Noted Date Resolved POA      No new Assessment & Plan notes have been filed under this hospital service since the last note was generated.  Service: Hospital Medicine      Discharged Condition: stable    Disposition: Psychiatric Hospital    Follow Up:  Follow-up Information     [facility].    Specialties:  Psychiatry, Behavioral Health  Contact information:  [address] [place] [address] RAMSEY WAN  [facility] [id number] [phone]                 Patient Instructions:     Diet general     Activity as tolerated     Call MD for:  temperature >100.4     Call MD for:  severe uncontrolled pain       Medications:  Reconciled Home Medications:   Discharge Medication List as of 7/6/2017  3:05 PM      START taking these medications    Details   acetaminophen (TYLENOL) 500 MG tablet Take 2 tablets (1,000 mg total) by mouth every 6 (six) hours as needed., Starting Thu 7/6/2017, OTC       ascorbic acid, vitamin C, (VITAMIN C) 500 MG tablet Take 1 tablet (500 mg total) by mouth 2 (two) times daily., Starting Thu 7/6/2017, OTC      cloNIDine (CATAPRES) 0.1 MG tablet Take 1 tablet (0.1 mg total) by mouth 3 (three) times daily., Starting Thu 7/6/2017, Until Fri 7/6/2018, Print      pantoprazole (PROTONIX) 40 MG tablet Take 1 tablet (40 mg total) by mouth once daily., Starting Thu 7/6/2017, Until Fri 7/6/2018, Print      quetiapine (SEROQUEL) 100 MG Tab Take 1 tablet (100 mg total) by mouth every evening., Starting Thu 7/6/2017, Until Fri 7/6/2018, Print      sertraline (ZOLOFT) 50 MG tablet Take 1 tablet (50 mg total) by mouth once daily., Starting Thu 7/6/2017, Until Fri 7/6/2018, Print           Time spent on the discharge of patient: 38 minutes    Buddy Griffiths MD  Department of Hospital Medicine  [facility]

## (undated) DEVICE — BANDAGE ACE 6 NSTRL

## (undated) DEVICE — SLING ARM MEDIUM